# Patient Record
Sex: FEMALE | Race: WHITE | URBAN - METROPOLITAN AREA
[De-identification: names, ages, dates, MRNs, and addresses within clinical notes are randomized per-mention and may not be internally consistent; named-entity substitution may affect disease eponyms.]

---

## 2020-07-25 ENCOUNTER — TELEPHONE ENCOUNTER (OUTPATIENT)
Dept: URBAN - METROPOLITAN AREA CLINIC 13 | Facility: CLINIC | Age: 80
End: 2020-07-25

## 2020-07-25 RX ORDER — POLYETHYLENE GLYCOL 3350, SODIUM SULFATE, SODIUM CHLORIDE, POTASSIUM CHLORIDE, ASCORBIC ACID, SODIUM ASCORBATE 7.5-2.691G
USE AS DIRECTED KIT ORAL
Qty: 1 | Refills: 0 | OUTPATIENT
Start: 2018-12-03 | End: 2019-01-02

## 2020-07-26 ENCOUNTER — TELEPHONE ENCOUNTER (OUTPATIENT)
Dept: URBAN - METROPOLITAN AREA CLINIC 13 | Facility: CLINIC | Age: 80
End: 2020-07-26

## 2020-07-26 RX ORDER — PROMETHAZINE HYDROCHLORIDE AND CODEINE PHOSPHATE 6.25; 1 MG/5ML; MG/5ML
SOLUTION ORAL
Qty: 120 | Refills: 0 | Status: ACTIVE | COMMUNITY
Start: 2019-01-05

## 2020-07-26 RX ORDER — LANSOPRAZOLE 30 MG/1
CAPSULE, DELAYED RELEASE ORAL
Qty: 90 | Refills: 0 | Status: ACTIVE | COMMUNITY
Start: 2018-01-02

## 2020-07-26 RX ORDER — LOSARTAN POTASSIUM AND HYDROCHLOROTHIAZIDE 100; 12.5 MG/1; MG/1
TAKE 1 TABLET DAILY TABLET, FILM COATED ORAL
Refills: 0 | Status: ACTIVE | COMMUNITY
Start: 2018-01-02

## 2020-07-26 RX ORDER — EZETIMIBE 10 MG/1
TAKE 1 TABLET DAILY TABLET ORAL
Refills: 0 | Status: ACTIVE | COMMUNITY
Start: 2017-12-15

## 2020-07-26 RX ORDER — FLUOROURACIL 50 MG/G
CREAM TOPICAL
Qty: 40 | Refills: 0 | Status: ACTIVE | COMMUNITY
Start: 2019-02-08

## 2020-07-26 RX ORDER — FLUTICASONE PROPIONATE 50 UG/1
USE 1 SPRAY IN EACH NOSTRIL ONCE DAILY SPRAY, METERED NASAL
Refills: 0 | Status: ACTIVE | COMMUNITY
Start: 2018-01-06

## 2020-07-26 RX ORDER — RALOXIFENE HYDROCHLORIDE 60 MG/1
TAKE 1 TABLET DAILY TABLET, FILM COATED ORAL
Refills: 0 | Status: ACTIVE | COMMUNITY
Start: 2018-01-19

## 2020-07-26 RX ORDER — ESCITALOPRAM 20 MG/1
TAKE 1 TABLET DAILY TABLET, FILM COATED ORAL
Refills: 0 | Status: ACTIVE | COMMUNITY
Start: 2018-02-09

## 2020-07-26 RX ORDER — AZITHROMYCIN DIHYDRATE 250 MG/1
TABLET, FILM COATED ORAL
Qty: 6 | Refills: 0 | Status: ACTIVE | COMMUNITY
Start: 2019-01-24

## 2020-07-26 RX ORDER — LANSOPRAZOLE 30 MG/1
CAPSULE, DELAYED RELEASE ORAL
Qty: 90 | Refills: 0 | Status: ACTIVE | COMMUNITY
Start: 2018-07-29

## 2020-07-26 RX ORDER — ACETAMINOPHEN 500 MG
TAKE AS DIRECTED TABLET ORAL
Refills: 0 | Status: ACTIVE | COMMUNITY

## 2020-07-26 RX ORDER — LEVOTHYROXINE SODIUM 25 UG/1
TAKE 1 TABLET DAILY TABLET ORAL
Refills: 0 | Status: ACTIVE | COMMUNITY
Start: 2018-01-25

## 2023-06-20 ENCOUNTER — DASHBOARD ENCOUNTERS (OUTPATIENT)
Age: 83
End: 2023-06-20

## 2023-06-20 ENCOUNTER — WEB ENCOUNTER (OUTPATIENT)
Dept: URBAN - METROPOLITAN AREA CLINIC 107 | Facility: CLINIC | Age: 83
End: 2023-06-20

## 2023-06-20 ENCOUNTER — OFFICE VISIT (OUTPATIENT)
Dept: URBAN - METROPOLITAN AREA CLINIC 107 | Facility: CLINIC | Age: 83
End: 2023-06-20
Payer: MEDICARE

## 2023-06-20 VITALS
DIASTOLIC BLOOD PRESSURE: 68 MMHG | HEIGHT: 67 IN | RESPIRATION RATE: 18 BRPM | WEIGHT: 139.6 LBS | HEART RATE: 69 BPM | TEMPERATURE: 97.1 F | BODY MASS INDEX: 21.91 KG/M2 | SYSTOLIC BLOOD PRESSURE: 158 MMHG

## 2023-06-20 DIAGNOSIS — Z86.010 HISTORY OF ADENOMATOUS POLYP OF COLON: ICD-10-CM

## 2023-06-20 DIAGNOSIS — K59.09 CHRONIC CONSTIPATION: ICD-10-CM

## 2023-06-20 PROBLEM — 236069009: Status: ACTIVE | Noted: 2023-06-20

## 2023-06-20 PROBLEM — 429047008: Status: ACTIVE | Noted: 2023-06-20

## 2023-06-20 PROCEDURE — 99214 OFFICE O/P EST MOD 30 MIN: CPT | Performed by: NURSE PRACTITIONER

## 2023-06-20 RX ORDER — ASPIRIN 81 MG/1
1 TABLET TABLET, COATED ORAL ONCE A DAY
Status: ACTIVE | COMMUNITY

## 2023-06-20 RX ORDER — OLMESARTAN MEDOXOMIL 40 MG/1
0.5 TABLET TABLET, FILM COATED ORAL ONCE A DAY
Status: ACTIVE | COMMUNITY

## 2023-06-20 RX ORDER — EZETIMIBE 10 MG/1
TAKE 1 TABLET DAILY TABLET ORAL
Refills: 0 | Status: ON HOLD | COMMUNITY
Start: 2017-12-15

## 2023-06-20 RX ORDER — PROMETHAZINE HYDROCHLORIDE AND CODEINE PHOSPHATE 6.25; 1 MG/5ML; MG/5ML
SOLUTION ORAL
Qty: 120 | Refills: 0 | Status: ON HOLD | COMMUNITY
Start: 2019-01-05

## 2023-06-20 RX ORDER — RALOXIFENE HYDROCHLORIDE 60 MG/1
TAKE 1 TABLET DAILY TABLET, FILM COATED ORAL
Refills: 0 | Status: ON HOLD | COMMUNITY
Start: 2018-01-19

## 2023-06-20 RX ORDER — FLUOROURACIL 50 MG/G
CREAM TOPICAL
Qty: 40 | Refills: 0 | Status: ON HOLD | COMMUNITY
Start: 2019-02-08

## 2023-06-20 RX ORDER — FLUTICASONE PROPIONATE 50 UG/1
USE 1 SPRAY IN EACH NOSTRIL ONCE DAILY SPRAY, METERED NASAL
Refills: 0 | Status: ACTIVE | COMMUNITY
Start: 2018-01-06

## 2023-06-20 RX ORDER — LANSOPRAZOLE 30 MG/1
CAPSULE, DELAYED RELEASE ORAL
Qty: 90 | Refills: 0 | Status: ON HOLD | COMMUNITY
Start: 2018-01-02

## 2023-06-20 RX ORDER — ACETAMINOPHEN 500 MG
TAKE AS DIRECTED TABLET ORAL
Refills: 0 | Status: ON HOLD | COMMUNITY

## 2023-06-20 RX ORDER — LOSARTAN POTASSIUM AND HYDROCHLOROTHIAZIDE 100; 12.5 MG/1; MG/1
TAKE 1 TABLET DAILY TABLET, FILM COATED ORAL
Refills: 0 | Status: ON HOLD | COMMUNITY
Start: 2018-01-02

## 2023-06-20 RX ORDER — ESCITALOPRAM OXALATE 10 MG/1
1 TABLET TABLET, FILM COATED ORAL ONCE A DAY
Status: ACTIVE | COMMUNITY

## 2023-06-20 RX ORDER — ESCITALOPRAM 20 MG/1
TAKE 1 TABLET DAILY TABLET, FILM COATED ORAL
Refills: 0 | Status: ON HOLD | COMMUNITY
Start: 2018-02-09

## 2023-06-20 RX ORDER — HYDROCHLOROTHIAZIDE 12.5 MG/1
0.5 TABLET IN THE MORNING TABLET ORAL ONCE A DAY
Status: ACTIVE | COMMUNITY

## 2023-06-20 RX ORDER — AZITHROMYCIN DIHYDRATE 250 MG/1
TABLET, FILM COATED ORAL
Qty: 6 | Refills: 0 | Status: ON HOLD | COMMUNITY
Start: 2019-01-24

## 2023-06-20 RX ORDER — RALOXIFENE HYDROCHLORIDE 60 MG/1
1 TABLET TABLET ORAL ONCE A DAY
Status: ACTIVE | COMMUNITY

## 2023-06-20 RX ORDER — EZETIMIBE 10 MG/1
1 TABLET TABLET ORAL ONCE A DAY
Status: ACTIVE | COMMUNITY

## 2023-06-20 RX ORDER — LEVOTHYROXINE SODIUM 25 UG/1
TAKE 1 TABLET DAILY TABLET ORAL
Refills: 0 | Status: ON HOLD | COMMUNITY
Start: 2018-01-25

## 2023-06-20 RX ORDER — LEVOTHYROXINE SODIUM 25 UG/1
1 TABLET IN THE MORNING ON AN EMPTY STOMACH TABLET ORAL ONCE A DAY
Status: ACTIVE | COMMUNITY

## 2023-06-20 NOTE — HPI-TODAY'S VISIT:
This is an 83-year-old female with a history of osteoarthritis, hypertension, hyperlipidemia, GERD, and adenomatous colon polyps due surveillance in 2024 presenting to discuss colonoscopy. She was last seen in the office in April 2019 following a colonoscopy in January 2019 showing internal hemorrhoids, sigmoid and descending diverticulosis, and removal of a 3 mm transverse tubular adenoma.  Surveillance recommended in 2024.  She had stopped taking Prevacid and denied acid reflux symptoms.  She was to begin daily fiber. She states she was taking Prevacid for years.  She discontinued it.  She is elevating the head of bed.  She has rare postprandial symptoms if she bends over after a meal.  She occasionally has pain in her abdomen associated with gas after eating broccoli or cabbage.  She has a history of chronic constipation.  2 or 3 times a week, she is using glycerin suppositories to assist with bowel movements.  She reports hard stools.  She used Metamucil in the past and reports it caused constipation to worsen.  She denies any other abdominal symptoms.

## 2023-08-28 ENCOUNTER — LAB OUTSIDE AN ENCOUNTER (OUTPATIENT)
Dept: URBAN - METROPOLITAN AREA CLINIC 107 | Facility: CLINIC | Age: 83
End: 2023-08-28

## 2023-12-05 ENCOUNTER — CLAIMS CREATED FROM THE CLAIM WINDOW (OUTPATIENT)
Dept: URBAN - METROPOLITAN AREA SURGERY CENTER 25 | Facility: SURGERY CENTER | Age: 83
End: 2023-12-05
Payer: MEDICARE

## 2023-12-05 ENCOUNTER — CLAIMS CREATED FROM THE CLAIM WINDOW (OUTPATIENT)
Dept: URBAN - METROPOLITAN AREA CLINIC 4 | Facility: CLINIC | Age: 83
End: 2023-12-05
Payer: MEDICARE

## 2023-12-05 DIAGNOSIS — K63.5 BENIGN COLON POLYPS: ICD-10-CM

## 2023-12-05 DIAGNOSIS — K57.30 COLON, DIVERTICULOSIS: ICD-10-CM

## 2023-12-05 DIAGNOSIS — Z12.11 COLON CANCER SCREENING (HIGH RISK): ICD-10-CM

## 2023-12-05 DIAGNOSIS — K63.5 BENIGN COLON POLYP: ICD-10-CM

## 2023-12-05 DIAGNOSIS — Z86.010 PERSONAL HISTORY OF COLON POLYPS: ICD-10-CM

## 2023-12-05 DIAGNOSIS — Z09 ENCOUNTER FOR FOLLOW-UP EXAMINATION AFTER COMPLETED TREATMENT FOR CONDITIONS OTHER THAN MALIGNANT NEOPLASM: ICD-10-CM

## 2023-12-05 DIAGNOSIS — K55.20 ANGIODYSPLASIA OF COLON WITHOUT BLEEDING: ICD-10-CM

## 2023-12-05 DIAGNOSIS — K92.1 MELENA: ICD-10-CM

## 2023-12-05 DIAGNOSIS — D12.3 ADENOMA OF TRANSVERSE COLON: ICD-10-CM

## 2023-12-05 DIAGNOSIS — D12.5 ADENOMATOUS POLYP OF SIGMOID COLON: ICD-10-CM

## 2023-12-05 DIAGNOSIS — K64.0 FIRST DEGREE HEMORRHOIDS: ICD-10-CM

## 2023-12-05 DIAGNOSIS — D12.3 BENIGN NEOPLASM OF TRANSVERSE COLON: ICD-10-CM

## 2023-12-05 DIAGNOSIS — K62.1 HYPERPLASTIC RECTAL POLYP: ICD-10-CM

## 2023-12-05 DIAGNOSIS — Z86.010 ADENOMAS PERSONAL HISTORY OF COLONIC POLYPS: ICD-10-CM

## 2023-12-05 PROCEDURE — 00811 ANES LWR INTST NDSC NOS: CPT | Performed by: ANESTHESIOLOGIST ASSISTANT

## 2023-12-05 PROCEDURE — 45385 COLONOSCOPY W/LESION REMOVAL: CPT | Performed by: INTERNAL MEDICINE

## 2023-12-05 PROCEDURE — 00811 ANES LWR INTST NDSC NOS: CPT | Performed by: ANESTHESIOLOGY

## 2023-12-05 PROCEDURE — 45385 COLONOSCOPY W/LESION REMOVAL: CPT | Performed by: CLINIC/CENTER

## 2023-12-05 PROCEDURE — G8907 PT DOC NO EVENTS ON DISCHARG: HCPCS | Performed by: CLINIC/CENTER

## 2023-12-05 PROCEDURE — 88305 TISSUE EXAM BY PATHOLOGIST: CPT | Performed by: PATHOLOGY

## 2023-12-05 RX ORDER — FLUOROURACIL 50 MG/G
CREAM TOPICAL
Qty: 40 | Refills: 0 | Status: ON HOLD | COMMUNITY
Start: 2019-02-08

## 2023-12-05 RX ORDER — ACETAMINOPHEN 500 MG
TAKE AS DIRECTED TABLET ORAL
Refills: 0 | Status: ON HOLD | COMMUNITY

## 2023-12-05 RX ORDER — ESCITALOPRAM 20 MG/1
TAKE 1 TABLET DAILY TABLET, FILM COATED ORAL
Refills: 0 | Status: ON HOLD | COMMUNITY
Start: 2018-02-09

## 2023-12-05 RX ORDER — FLUTICASONE PROPIONATE 50 UG/1
USE 1 SPRAY IN EACH NOSTRIL ONCE DAILY SPRAY, METERED NASAL
Refills: 0 | Status: ACTIVE | COMMUNITY
Start: 2018-01-06

## 2023-12-05 RX ORDER — LEVOTHYROXINE SODIUM 25 UG/1
TAKE 1 TABLET DAILY TABLET ORAL
Refills: 0 | Status: ON HOLD | COMMUNITY
Start: 2018-01-25

## 2023-12-05 RX ORDER — LANSOPRAZOLE 30 MG/1
CAPSULE, DELAYED RELEASE ORAL
Qty: 90 | Refills: 0 | Status: ON HOLD | COMMUNITY
Start: 2018-01-02

## 2023-12-05 RX ORDER — EZETIMIBE 10 MG/1
TAKE 1 TABLET DAILY TABLET ORAL
Refills: 0 | Status: ON HOLD | COMMUNITY
Start: 2017-12-15

## 2023-12-05 RX ORDER — ASPIRIN 81 MG/1
1 TABLET TABLET, COATED ORAL ONCE A DAY
Status: ACTIVE | COMMUNITY

## 2023-12-05 RX ORDER — RALOXIFENE HYDROCHLORIDE 60 MG/1
1 TABLET TABLET ORAL ONCE A DAY
Status: ACTIVE | COMMUNITY

## 2023-12-05 RX ORDER — RALOXIFENE HYDROCHLORIDE 60 MG/1
TAKE 1 TABLET DAILY TABLET, FILM COATED ORAL
Refills: 0 | Status: ON HOLD | COMMUNITY
Start: 2018-01-19

## 2023-12-05 RX ORDER — PROMETHAZINE HYDROCHLORIDE AND CODEINE PHOSPHATE 6.25; 1 MG/5ML; MG/5ML
SOLUTION ORAL
Qty: 120 | Refills: 0 | Status: ON HOLD | COMMUNITY
Start: 2019-01-05

## 2023-12-05 RX ORDER — LEVOTHYROXINE SODIUM 25 UG/1
1 TABLET IN THE MORNING ON AN EMPTY STOMACH TABLET ORAL ONCE A DAY
Status: ACTIVE | COMMUNITY

## 2023-12-05 RX ORDER — OLMESARTAN MEDOXOMIL 40 MG/1
0.5 TABLET TABLET, FILM COATED ORAL ONCE A DAY
Status: ACTIVE | COMMUNITY

## 2023-12-05 RX ORDER — HYDROCHLOROTHIAZIDE 12.5 MG/1
0.5 TABLET IN THE MORNING TABLET ORAL ONCE A DAY
Status: ACTIVE | COMMUNITY

## 2023-12-05 RX ORDER — LOSARTAN POTASSIUM AND HYDROCHLOROTHIAZIDE 100; 12.5 MG/1; MG/1
TAKE 1 TABLET DAILY TABLET, FILM COATED ORAL
Refills: 0 | Status: ON HOLD | COMMUNITY
Start: 2018-01-02

## 2023-12-05 RX ORDER — AZITHROMYCIN DIHYDRATE 250 MG/1
TABLET, FILM COATED ORAL
Qty: 6 | Refills: 0 | Status: ON HOLD | COMMUNITY
Start: 2019-01-24

## 2023-12-05 RX ORDER — EZETIMIBE 10 MG/1
1 TABLET TABLET ORAL ONCE A DAY
Status: ACTIVE | COMMUNITY

## 2023-12-05 RX ORDER — ESCITALOPRAM OXALATE 10 MG/1
1 TABLET TABLET, FILM COATED ORAL ONCE A DAY
Status: ACTIVE | COMMUNITY